# Patient Record
Sex: FEMALE | NOT HISPANIC OR LATINO | Employment: FULL TIME | URBAN - METROPOLITAN AREA
[De-identification: names, ages, dates, MRNs, and addresses within clinical notes are randomized per-mention and may not be internally consistent; named-entity substitution may affect disease eponyms.]

---
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
## 2023-04-19 ENCOUNTER — DOCUMENTATION ONLY (OUTPATIENT)
Dept: OTHER | Facility: CLINIC | Age: 23
End: 2023-04-19
Payer: COMMERCIAL

## 2023-05-02 ENCOUNTER — HOSPITAL ENCOUNTER (EMERGENCY)
Facility: CLINIC | Age: 23
Discharge: HOME | End: 2023-05-02
Attending: PHYSICIAN ASSISTANT | Admitting: PHYSICIAN ASSISTANT
Payer: COMMERCIAL

## 2023-05-02 VITALS
SYSTOLIC BLOOD PRESSURE: 109 MMHG | DIASTOLIC BLOOD PRESSURE: 70 MMHG | WEIGHT: 140 LBS | OXYGEN SATURATION: 98 % | HEART RATE: 84 BPM | RESPIRATION RATE: 18 BRPM | TEMPERATURE: 98.2 F

## 2023-05-02 DIAGNOSIS — J02.0 STREPTOCOCCAL PHARYNGITIS: ICD-10-CM

## 2023-05-02 LAB — GROUP A STREP BY PCR: DETECTED

## 2023-05-02 PROCEDURE — 99283 EMERGENCY DEPT VISIT LOW MDM: CPT

## 2023-05-02 PROCEDURE — 87651 STREP A DNA AMP PROBE: CPT | Performed by: EMERGENCY MEDICINE

## 2023-05-02 RX ORDER — PENICILLIN V POTASSIUM 500 MG/1
500 TABLET, FILM COATED ORAL 2 TIMES DAILY
Qty: 20 TABLET | Refills: 0 | Status: SHIPPED | OUTPATIENT
Start: 2023-05-02 | End: 2023-05-12

## 2023-05-02 ASSESSMENT — ENCOUNTER SYMPTOMS
NAUSEA: 0
ABDOMINAL PAIN: 0
RHINORRHEA: 0
VOMITING: 0
FEVER: 0
COUGH: 0
SHORTNESS OF BREATH: 0
SORE THROAT: 1

## 2023-05-02 ASSESSMENT — ACTIVITIES OF DAILY LIVING (ADL): ADLS_ACUITY_SCORE: 33

## 2023-05-02 NOTE — ED TRIAGE NOTES
Pt reports that she has had 2 days of sore throat and noted right sided neck swelling today, pt ABC's intact without need for intervention, no Fevers at home per pt. VSS and ABC's intact

## 2023-05-02 NOTE — ED PROVIDER NOTES
History     Chief Complaint:  Pharyngitis and Neck Swelling     The history is provided by the patient.      Woody Huynh is a 22 year old female with a history of asthma and anxiety who presents alone for pharyngitis and neck swelling. For the past two days, she has had a sore throat and right sided neck swelling. Nonetheless, patient denies having any fevers, rhinorrhea, shortness of breath, nausea, vomiting, abdominal pain, or cough. Denies being around any known ill contacts.    Independent Historian:   None - Patient Only    Review of External Notes: None.     ROS:  Review of Systems   Constitutional: Negative for fever.   HENT: Positive for sore throat. Negative for rhinorrhea.    Respiratory: Negative for cough and shortness of breath.    Gastrointestinal: Negative for abdominal pain, nausea and vomiting.   Musculoskeletal:        (+) Neck Swelling   All other systems reviewed and are negative.    Allergies:  Avocado  Nuts  Banana  Coconut Oil  Molds & Smuts  Pineapple  Shrimp   Cantaloupe     Medications:    albuterol   cetirizine  doxepin   EPINEPHrine   Fluticasone Propionate  hydrOXYzine  ipratropium  Mometasone Furo-Formoterol Fum  Mometasone Furoate  Montelukast Sodium  omalizumab  omeprazole   ondansetron   polyethylene glycol   RaNITidine   Tiotropium Bromide Monohydrate  vitamin D    Past Medical History:    Asthma  Eczema   Dyslexia  anxiety   depressed mood  Vitamin D deficiency  Pruritus  Xerosis cutis  Menorrhagia with irregular cycle  migraine     Past Surgical History:    Patient denies having any past surgical history.    Family History:    Father: Asthma, cardiovascular disease, cirrhosis, hypertension, diabetes type 2  Sister: Allergies, asthma, Depression    Social History:  Patient presents alone.  Patient presents via private vehicle.   PCP: Allison Fuentes     Physical Exam     Patient Vitals for the past 24 hrs:   BP Temp Temp src Pulse Resp SpO2 Weight   05/02/23 1728  109/70 98.2  F (36.8  C) Temporal 84 18 98 % 63.5 kg (140 lb)     Physical Exam  Constitutional: Pleasant. Cooperative.   Eyes: Pupils equally round and reactive  HENT: Head is normal in appearance. TMs normal. Moist mucous membranes. Oropharyngeal erythema. 1+ tonsils bilaterally, no exudates. No palatal asymmetry. Uvula midline. No trismus. No muffled voice. Tolerating their secretions.  Cardiovascular: Regular rate and rhythm.  Respiratory: Normal respiratory effort, lungs are clear bilaterally.  Neck: Normal ROM. Right sided anterior cervical lymphadenopathy.  Skin: Normal, without rash.  Neurologic: Cranial nerves grossly intact. Alert.  Nursing notes and vital signs reviewed.      Emergency Department Course     Laboratory:  Labs Ordered and Resulted from Time of ED Arrival to Time of ED Departure   GROUP A STREPTOCOCCUS PCR THROAT SWAB - Abnormal       Result Value    Group A strep by PCR Detected (*)       Emergency Department Course & Assessments:     Interventions:  Medications - No data to display     Assessments:  1924 I met with patient after reviewing notes, past charts, and vitals.    Independent Interpretation (X-rays, CTs, rhythm strip):  None.    Consultations/Discussion of Management or Tests:  None     Social Determinants of Health affecting care:   None    Disposition:  The patient was discharged to home.     Impression & Plan      CMS Diagnoses: None    Medical Decision Making:  Woody Huynh is a 22 year old female who presents for evaluation of a sore throat and clinical evidence of pharyngitis.  Strep PCR is positive. There is no clinical evidence of peritonsillar abscess, retropharyngeal abscess, Lemierre's Syndrome, epiglottis, or Lonnie's angina. The patient's symptoms are consistent with streptococcal pharyngitis.  I have recommended treatment with antibiotics and analgesics.  Return if increasing pain, change in voice, neck pain, vomiting, fever, or shortness of breath. Follow-up  with primary physician if not improving in 3-5 days. Patient discharged in stable condition.    Diagnosis:    ICD-10-CM    1. Streptococcal pharyngitis  J02.0            Discharge Medications:  Discharge Medication List as of 5/2/2023  7:36 PM      START taking these medications    Details   penicillin V (VEETID) 500 MG tablet Take 1 tablet (500 mg) by mouth 2 times daily for 10 days, Disp-20 tablet, R-0, E-Prescribe            Scribe Disclosure:  I, Cisco Pitt, am serving as a scribe at 6:50 PM on 5/2/2023 to document services personally performed by Colin Valdez PA-C based on my observations and the provider's statements to me.      5/2/2023   Colin Valdez PA-C     This record was created at least in part using electronic voice recognition software, so please excuse any typographical errors.       Colin Valdez PA-C  05/03/23 0019

## 2025-03-24 ENCOUNTER — OFFICE VISIT (OUTPATIENT)
Dept: URGENT CARE | Facility: URGENT CARE | Age: 25
End: 2025-03-24
Payer: COMMERCIAL

## 2025-03-24 VITALS
SYSTOLIC BLOOD PRESSURE: 110 MMHG | BODY MASS INDEX: 27.48 KG/M2 | HEART RATE: 101 BPM | TEMPERATURE: 98.7 F | RESPIRATION RATE: 20 BRPM | OXYGEN SATURATION: 97 % | WEIGHT: 171 LBS | DIASTOLIC BLOOD PRESSURE: 70 MMHG | HEIGHT: 66 IN

## 2025-03-24 DIAGNOSIS — R07.0 THROAT PAIN: ICD-10-CM

## 2025-03-24 DIAGNOSIS — J10.1 INFLUENZA B: Primary | ICD-10-CM

## 2025-03-24 LAB
DEPRECATED S PYO AG THROAT QL EIA: NEGATIVE
FLUAV AG SPEC QL IA: NEGATIVE
FLUBV AG SPEC QL IA: POSITIVE
S PYO DNA THROAT QL NAA+PROBE: NOT DETECTED

## 2025-03-24 PROCEDURE — 87651 STREP A DNA AMP PROBE: CPT | Performed by: PHYSICIAN ASSISTANT

## 2025-03-24 PROCEDURE — 87804 INFLUENZA ASSAY W/OPTIC: CPT | Performed by: PHYSICIAN ASSISTANT

## 2025-03-24 PROCEDURE — 99203 OFFICE O/P NEW LOW 30 MIN: CPT | Performed by: PHYSICIAN ASSISTANT

## 2025-03-24 NOTE — PATIENT INSTRUCTIONS
Patient was educated on the natural course of viral illness which typically lasts up to 10 days.  Conservative measures include increased fluids, warm steamy shower, salt water gargles, cough suppressants, expectorants (Mucinex), and analgesics (Tylenol and/or Ibuprofen). See your primary care provider if symptoms worsen or do not improve in 7 days. Seek emergency care if you develop fever over 104 or shortness of breath.

## 2025-03-24 NOTE — PROGRESS NOTES
URGENT CARE VISIT:    SUBJECTIVE:   Woody Huynh is a 24 year old female presenting with a chief complaint of fever, chills, cough - productive, and body aches.  Onset was 2 day(s) ago.   She denies the following symptoms: shortness of breath  Course of illness is worsening.    Treatment measures tried include Dayquil tried with no relief of symptoms.  Predisposing factors include exposure to strep.    PMH:   Past Medical History:   Diagnosis Date    Asthma     Eczema     Uncomplicated asthma      Allergies: Avocado, Nuts, Banana, Coconut (cocos nucifera), Molds & smuts, Pineapple, and Shrimp   Medications:   Current Outpatient Medications   Medication Sig Dispense Refill    adapalene (DIFFERIN) 0.1 % cream Small amount to whole face 2 nights per week for acne. (Patient not taking: Reported on 3/24/2025) 45 g 11    albuterol (PROAIR HFA, PROVENTIL HFA, VENTOLIN HFA) 108 (90 BASE) MCG/ACT inhaler Inhale 4 puffs into the lungs every 6 hours (Patient not taking: Reported on 3/24/2025)      betamethasone, augmented, (DIPROLENE) 0.05 % lotion  (Patient not taking: Reported on 3/24/2025)      cetirizine (ZYRTEC) 10 MG tablet TAKE 1 TABLET ONCE A DAY ORALLY (Patient not taking: Reported on 3/24/2025)      clindamycin (CLEOCIN T) 1 % lotion To whole face ever am for acne (Patient not taking: Reported on 3/24/2025) 60 mL 11    doxepin (SINEQUAN) 10 MG capsule Take 1 capsule (10 mg) by mouth At Bedtime (Patient not taking: Reported on 3/24/2025) 30 capsule 1    EPINEPHrine (EPIPEN IJ) Inject as directed as needed (Patient not taking: Reported on 3/24/2025)      fluticasone (CUTIVATE) 0.05 % cream  (Patient not taking: Reported on 3/24/2025)      Fluticasone Propionate (FLONASE NA)  (Patient not taking: Reported on 3/24/2025)      hydrocortisone 2.5 % cream  (Patient not taking: Reported on 3/24/2025)      hydrOXYzine (ATARAX) 25 MG tablet Take 25 mg by mouth (Patient not taking: Reported on 3/24/2025)      insulin  "syringe 31G X 5/16\" 1 ML MISC Use with methotrexate (Patient not taking: Reported on 3/24/2025) 10 each 11    ipratropium - albuterol 0.5 mg/2.5 mg/3 mL (DUONEB) 0.5-2.5 (3) MG/3ML neb solution Indications: PN:   RASTAALIDATIMOTEOEVELIA Muñoz Sep 22, 2015  9:38 AM Received from: External Pharmacy (Patient not taking: Reported on 3/24/2025)      methotrexate 50 MG/2ML injection CHEMO Inject 0.7 mLs (17.5 mg) Subcutaneous once a week (Patient not taking: Reported on 3/24/2025) 4 mL 3    mometasone (ELOCON) 0.1 % ointment Twice daily to arm rash until clear, then as needed. (Patient not taking: Reported on 3/24/2025) 90 g 2    Mometasone Furo-Formoterol Fum (DULERA IN) Inhale 2 puffs into the lungs 2 times daily (Patient not taking: Reported on 3/24/2025)      Mometasone Furoate 200 MCG/ACT AERO Inhale 2 puffs into the lungs (Patient not taking: Reported on 3/24/2025)      Montelukast Sodium (SINGULAIR PO) Take 10 mg by mouth daily (Patient not taking: Reported on 3/24/2025)      omalizumab (XOLAIR) 150 MG injection  (Patient not taking: Reported on 3/24/2025)      omeprazole (PRILOSEC) 40 MG capsule  (Patient not taking: Reported on 3/24/2025)      ondansetron (ZOFRAN) 8 MG tablet Take 1 tablet (8 mg) by mouth every 8 hours as needed for nausea (Patient not taking: Reported on 3/24/2025) 8 tablet 3    polyethylene glycol (MIRALAX/GLYCOLAX) powder  (Patient not taking: Reported on 3/24/2025)      RaNITidine HCl (ZANTAC PO) Take 75 mg by mouth daily (Patient not taking: Reported on 3/24/2025)      tacrolimus (PROTOPIC) 0.1 % ointment To face twice daily (Patient not taking: Reported on 3/24/2025) 60 g 2    Tiotropium Bromide Monohydrate (SPIRIVA HANDIHALER IN) Inhale 1 puff into the lungs daily (Patient not taking: Reported on 3/24/2025)      triamcinolone (KENALOG) 0.1 % ointment To all areas of eczema on the arms, legs, body twice daily until next visit. (Patient not taking: Reported on 3/24/2025) 454 g 1    vitamin D " "(ERGOCALCIFEROL) 00281 UNIT capsule Take 1 capsule (50,000 Units) by mouth every 7 days (Patient not taking: Reported on 3/24/2025) 8 capsule 0     Social History:   Social History     Tobacco Use    Smoking status: Never    Smokeless tobacco: Never   Substance Use Topics    Alcohol use: No     Alcohol/week: 0.0 standard drinks of alcohol       ROS:  Review of systems negative except as stated above.    OBJECTIVE:  /70 (BP Location: Right arm, Patient Position: Sitting, Cuff Size: Adult Regular)   Pulse 101   Temp 98.7  F (37.1  C) (Tympanic)   Resp 20   Ht 1.676 m (5' 6\")   Wt 77.6 kg (171 lb)   SpO2 97%   BMI 27.60 kg/m    GENERAL APPEARANCE: healthy, alert and no distress  EYES: EOMI,  PERRL, conjunctiva clear  HENT: ear canals and TM's normal.  Nose and mouth without ulcers, erythema or lesions  NECK: supple, nontender, no lymphadenopathy  RESP: lungs clear to auscultation - no rales, rhonchi or wheezes  CV: regular rates and rhythm, normal S1 S2, no murmur noted  SKIN: no suspicious lesions or rashes    Labs:    Results for orders placed or performed in visit on 03/24/25   Influenza A & B Antigen - Clinic Collect     Status: Abnormal    Specimen: Nasopharyngeal; Swab   Result Value Ref Range    Influenza A antigen Negative Negative    Influenza B antigen Positive (A) Negative    Narrative    Test results must be correlated with clinical data. If necessary, results should be confirmed by a molecular assay or viral culture.   Streptococcus A Rapid Screen w/Reflex to PCR - Clinic Collect     Status: Normal    Specimen: Throat; Swab   Result Value Ref Range    Group A Strep antigen Negative Negative       ASSESSMENT:    ICD-10-CM    1. Influenza B  J10.1 Influenza A & B Antigen - Clinic Collect      2. Throat pain  R07.0 Streptococcus A Rapid Screen w/Reflex to PCR - Clinic Collect     Group A Streptococcus PCR Throat Swab          PLAN:  Patient Instructions   Patient was educated on the natural " course of viral illness which typically lasts up to 10 days.  Conservative measures include increased fluids, warm steamy shower, salt water gargles, cough suppressants, expectorants (Mucinex), and analgesics (Tylenol and/or Ibuprofen). See your primary care provider if symptoms worsen or do not improve in 7 days. Seek emergency care if you develop fever over 104 or shortness of breath.    Patient verbalized understanding and is agreeable to plan. The patient was discharged ambulatory and in stable condition.    Princess Russ PA-C ....................  3/24/2025   11:30 AM

## 2025-03-24 NOTE — LETTER
March 24, 2025      Woody Huynh  [id number]  [facility] [id number]        To Whom It May Concern:    Woody Huynh  was seen today in clinic.         Sincerely,        Princess Russ PA-C    Electronically signed